# Patient Record
Sex: MALE | Race: WHITE | Employment: OTHER | URBAN - METROPOLITAN AREA
[De-identification: names, ages, dates, MRNs, and addresses within clinical notes are randomized per-mention and may not be internally consistent; named-entity substitution may affect disease eponyms.]

---

## 2022-02-17 ENCOUNTER — HOSPITAL ENCOUNTER (OUTPATIENT)
Age: 67
Setting detail: OBSERVATION
Discharge: HOME | End: 2022-02-18
Attending: EMERGENCY MEDICINE | Admitting: FAMILY MEDICINE
Payer: COMMERCIAL

## 2022-02-17 ENCOUNTER — APPOINTMENT (OUTPATIENT)
Dept: CT IMAGING | Age: 67
End: 2022-02-17
Payer: COMMERCIAL

## 2022-02-17 ENCOUNTER — APPOINTMENT (OUTPATIENT)
Dept: GENERAL RADIOLOGY | Age: 67
End: 2022-02-17
Payer: COMMERCIAL

## 2022-02-17 DIAGNOSIS — H53.8 BLURRED VISION, LEFT EYE: ICD-10-CM

## 2022-02-17 DIAGNOSIS — M54.2 NECK PAIN: ICD-10-CM

## 2022-02-17 DIAGNOSIS — R13.10 DYSPHAGIA, UNSPECIFIED TYPE: ICD-10-CM

## 2022-02-17 DIAGNOSIS — R07.9 CHEST PAIN, UNSPECIFIED TYPE: Primary | ICD-10-CM

## 2022-02-17 PROBLEM — R42 DIZZINESS: Status: ACTIVE | Noted: 2022-02-17

## 2022-02-17 LAB
A/G RATIO: 1.5 (ref 1.1–2.2)
ALBUMIN SERPL-MCNC: 4.4 G/DL (ref 3.4–5)
ALP BLD-CCNC: 89 U/L (ref 40–129)
ALT SERPL-CCNC: 31 U/L (ref 10–40)
ANION GAP SERPL CALCULATED.3IONS-SCNC: 14 MMOL/L (ref 3–16)
APTT: 34.1 SEC (ref 26.2–38.6)
AST SERPL-CCNC: 20 U/L (ref 15–37)
BASOPHILS ABSOLUTE: 0 K/UL (ref 0–0.2)
BASOPHILS RELATIVE PERCENT: 0.4 %
BILIRUB SERPL-MCNC: 0.3 MG/DL (ref 0–1)
BUN BLDV-MCNC: 17 MG/DL (ref 7–20)
CALCIUM SERPL-MCNC: 9.7 MG/DL (ref 8.3–10.6)
CHLORIDE BLD-SCNC: 100 MMOL/L (ref 99–110)
CO2: 25 MMOL/L (ref 21–32)
CREAT SERPL-MCNC: 1.3 MG/DL (ref 0.8–1.3)
EOSINOPHILS ABSOLUTE: 0.2 K/UL (ref 0–0.6)
EOSINOPHILS RELATIVE PERCENT: 2.3 %
GFR AFRICAN AMERICAN: >60
GFR NON-AFRICAN AMERICAN: 55
GLUCOSE BLD-MCNC: 104 MG/DL (ref 70–99)
HCT VFR BLD CALC: 44.6 % (ref 40.5–52.5)
HEMOGLOBIN: 14.8 G/DL (ref 13.5–17.5)
INR BLD: 0.96 (ref 0.88–1.12)
LYMPHOCYTES ABSOLUTE: 1.7 K/UL (ref 1–5.1)
LYMPHOCYTES RELATIVE PERCENT: 19.6 %
MCH RBC QN AUTO: 30.5 PG (ref 26–34)
MCHC RBC AUTO-ENTMCNC: 33 G/DL (ref 31–36)
MCV RBC AUTO: 92.3 FL (ref 80–100)
MONOCYTES ABSOLUTE: 0.8 K/UL (ref 0–1.3)
MONOCYTES RELATIVE PERCENT: 9.3 %
NEUTROPHILS ABSOLUTE: 5.9 K/UL (ref 1.7–7.7)
NEUTROPHILS RELATIVE PERCENT: 68.4 %
PDW BLD-RTO: 13.6 % (ref 12.4–15.4)
PLATELET # BLD: 271 K/UL (ref 135–450)
PMV BLD AUTO: 7.1 FL (ref 5–10.5)
POTASSIUM REFLEX MAGNESIUM: 4.3 MMOL/L (ref 3.5–5.1)
PROTHROMBIN TIME: 10.8 SEC (ref 9.9–12.7)
RBC # BLD: 4.84 M/UL (ref 4.2–5.9)
SODIUM BLD-SCNC: 139 MMOL/L (ref 136–145)
TOTAL PROTEIN: 7.4 G/DL (ref 6.4–8.2)
TROPONIN: <0.01 NG/ML
WBC # BLD: 8.7 K/UL (ref 4–11)

## 2022-02-17 PROCEDURE — 85610 PROTHROMBIN TIME: CPT

## 2022-02-17 PROCEDURE — 84484 ASSAY OF TROPONIN QUANT: CPT

## 2022-02-17 PROCEDURE — 93005 ELECTROCARDIOGRAM TRACING: CPT | Performed by: PHYSICIAN ASSISTANT

## 2022-02-17 PROCEDURE — G0378 HOSPITAL OBSERVATION PER HR: HCPCS

## 2022-02-17 PROCEDURE — 36415 COLL VENOUS BLD VENIPUNCTURE: CPT

## 2022-02-17 PROCEDURE — 85025 COMPLETE CBC W/AUTO DIFF WBC: CPT

## 2022-02-17 PROCEDURE — 71045 X-RAY EXAM CHEST 1 VIEW: CPT

## 2022-02-17 PROCEDURE — 99285 EMERGENCY DEPT VISIT HI MDM: CPT

## 2022-02-17 PROCEDURE — 70496 CT ANGIOGRAPHY HEAD: CPT

## 2022-02-17 PROCEDURE — 6360000004 HC RX CONTRAST MEDICATION: Performed by: EMERGENCY MEDICINE

## 2022-02-17 PROCEDURE — 85730 THROMBOPLASTIN TIME PARTIAL: CPT

## 2022-02-17 PROCEDURE — 6370000000 HC RX 637 (ALT 250 FOR IP): Performed by: EMERGENCY MEDICINE

## 2022-02-17 PROCEDURE — 70450 CT HEAD/BRAIN W/O DYE: CPT

## 2022-02-17 PROCEDURE — 6370000000 HC RX 637 (ALT 250 FOR IP): Performed by: PHYSICIAN ASSISTANT

## 2022-02-17 PROCEDURE — 80053 COMPREHEN METABOLIC PANEL: CPT

## 2022-02-17 PROCEDURE — 2060000000 HC ICU INTERMEDIATE R&B

## 2022-02-17 RX ORDER — ASPIRIN 81 MG/1
324 TABLET, CHEWABLE ORAL ONCE
Status: COMPLETED | OUTPATIENT
Start: 2022-02-17 | End: 2022-02-17

## 2022-02-17 RX ORDER — OXYCODONE HYDROCHLORIDE AND ACETAMINOPHEN 5; 325 MG/1; MG/1
1 TABLET ORAL ONCE
Status: COMPLETED | OUTPATIENT
Start: 2022-02-17 | End: 2022-02-17

## 2022-02-17 RX ORDER — OXYCODONE HYDROCHLORIDE AND ACETAMINOPHEN 5; 325 MG/1; MG/1
1 TABLET ORAL EVERY 4 HOURS PRN
COMMUNITY

## 2022-02-17 RX ADMIN — ASPIRIN 81 MG 324 MG: 81 TABLET ORAL at 17:45

## 2022-02-17 RX ADMIN — IOPAMIDOL 75 ML: 755 INJECTION, SOLUTION INTRAVENOUS at 16:22

## 2022-02-17 RX ADMIN — OXYCODONE HYDROCHLORIDE AND ACETAMINOPHEN 1 TABLET: 5; 325 TABLET ORAL at 18:15

## 2022-02-17 ASSESSMENT — ENCOUNTER SYMPTOMS
CONSTIPATION: 0
ABDOMINAL PAIN: 0
SHORTNESS OF BREATH: 1
COUGH: 0
DIARRHEA: 0
VOMITING: 0
TROUBLE SWALLOWING: 1
NAUSEA: 0
CHEST TIGHTNESS: 1
BACK PAIN: 0

## 2022-02-17 ASSESSMENT — PAIN DESCRIPTION - DESCRIPTORS
DESCRIPTORS: ACHING
DESCRIPTORS: ACHING;BURNING;SHOOTING;STABBING

## 2022-02-17 ASSESSMENT — PAIN SCALES - GENERAL
PAINLEVEL_OUTOF10: 2
PAINLEVEL_OUTOF10: 5

## 2022-02-17 ASSESSMENT — PAIN DESCRIPTION - PAIN TYPE
TYPE: ACUTE PAIN
TYPE: CHRONIC PAIN

## 2022-02-17 ASSESSMENT — PAIN DESCRIPTION - ORIENTATION
ORIENTATION: LEFT;RIGHT
ORIENTATION: LOWER

## 2022-02-17 ASSESSMENT — PAIN DESCRIPTION - LOCATION
LOCATION: HEAD;LEG
LOCATION: BACK

## 2022-02-17 NOTE — ED NOTES
Bed: 29  Expected date:   Expected time:   Means of arrival:   Comments:  Ben Devries, ERNESTINE  02/17/22 1523

## 2022-02-17 NOTE — ED PROVIDER NOTES
[facility]        Pt Name: Chanel Conner  MRN: [id number]  [place] 1955  Date of evaluation: 2/17/2022  Provider: BREE Ibrahim  PCP: Nidhi Sweet MD  ED Attending: Dr. Darylene Pool have seen and evaluated this patient with my supervising physician Jasmin Guardado, [address] [place] [address]       Chief Complaint   Patient presents with    Shortness of Breath     Pt arrives via [facility], c/o sob and dizziness starting about 1310 today. Pt states it feels like his \"throat is closing up\" and states he has blurriness in his vision beginning on his left side now experiencing it on his right side. Pt also c/o muscle shaking and anxiety.  Dizziness       HISTORY OF PRESENT ILLNESS   (Location, Timing/Onset, Context/Setting, Quality, Duration, Modifying Factors, Severity, Associated Signs and Symptoms)  Note limiting factors. Chanel Conner is a 73 y.o. male who presents to the emergency department with complaints of having an episode starting at 1 PM where he was having chest discomfort, shortness of breath and feels like his throat is closing. He states that he had blurriness in the left side and now is on his right side. He feels that he is very shaky and having anxiety. Denies sensation of room spinning. He feels that his throat is very dry, states that water was helping but they told him to stop drinking water. Patient on initial examination is very rude. Patient denies any difficulty swallowing as he is able to drink water. He feels a heaviness in the left side of his chest.  He has a history of hypertension. No history of cardiac disease. Denies numbness, tingling or focal weakness in upper or lower extremities. Asked him specifically if he felt like he was having any strokelike symptoms and he said no.     Nursing Notes were all reviewed and agreed with or any disagreements were addressed in the HPI.    REVIEW OF SYSTEMS    (2-9 systems for level 4, 10 or more for level 5)     Review of Systems   Constitutional: Negative for chills, fatigue and fever. HENT: Positive for trouble swallowing. Eyes: Positive for visual disturbance. Respiratory: Positive for chest tightness and shortness of breath. Negative for cough. Cardiovascular: Positive for chest pain. Negative for palpitations and leg swelling. Gastrointestinal: Negative for abdominal pain, constipation, diarrhea, nausea and vomiting. Musculoskeletal: Negative for back pain and myalgias. Neurological: Negative for dizziness, syncope, speech difficulty, weakness, light-headedness, numbness and headaches. All other systems reviewed and are negative. Positives and Pertinent negatives as per HPI. Except as noted above in the ROS, all other systems were reviewed and negative. PAST MEDICAL HISTORY     Past Medical History:   Diagnosis Date    Hypertension          SURGICAL HISTORY     Past Surgical History:   Procedure Laterality Date    BACK SURGERY           CURRENTMEDICATIONS       Previous Medications    LOSARTAN POTASSIUM (COZAAR PO)    Take by mouth    OXYCODONE-ACETAMINOPHEN (PERCOCET) 5-325 MG PER TABLET    Take 1 tablet by mouth every 4 hours as needed for Pain. VITAMIN D, CHOLECALCIFEROL, PO    Take by mouth         ALLERGIES     Unable to assess    FAMILYHISTORY     History reviewed. No pertinent family history.        SOCIAL HISTORY       Social History     Tobacco Use    Smoking status: Never Smoker    Smokeless tobacco: Never Used   Substance Use Topics    Alcohol use: Not Currently     Comment: occa    Drug use: Yes     Types: Marijuana (Weed)     Comment: occas       SCREENINGS    [place] Coma Scale  Eye Opening: Spontaneous  Best Verbal Response: Oriented  Best Motor Response: Obeys commands  Christal Coma Scale Score: 15        PHYSICAL EXAM    (up to 7 for level 4, 8 or more for level 5)     ED Triage Vitals [02/17/22 1431]   BP Temp Temp Source Pulse Resp SpO2 Height Weight   133/68 98.6 °F (37 °C) Oral 71 25 100 % 5' 9\" (1.753 m) 283 lb (128.4 kg)       Physical Exam  Vitals and nursing note reviewed. Constitutional:       Appearance: Normal appearance. He is well-developed. He is not toxic-appearing or diaphoretic. HENT:      Head: Normocephalic. Right Ear: External ear normal.      Left Ear: External ear normal.      Nose: Nose normal.   Eyes:      General:         Right eye: No discharge. Left eye: No discharge. Conjunctiva/sclera: Conjunctivae normal.      Pupils: Pupils are equal, round, and reactive to light. Neck:      Trachea: Phonation normal.      Meningeal: Brudzinski's sign and Kernig's sign absent. Cardiovascular:      Rate and Rhythm: Normal rate and regular rhythm. Heart sounds: Normal heart sounds. No murmur heard. No friction rub. No gallop. Pulmonary:      Effort: Pulmonary effort is normal. No respiratory distress. Breath sounds: Normal breath sounds. No stridor. No wheezing or rales. Musculoskeletal:         General: Normal range of motion. Cervical back: Full passive range of motion without pain, normal range of motion and neck supple. No rigidity. No spinous process tenderness or muscular tenderness. Normal range of motion. Skin:     General: Skin is warm and dry. Coloration: Skin is not pale. Neurological:      Mental Status: He is alert and oriented to person, place, and time. He is not disoriented. GCS: GCS eye subscore is 4. GCS verbal subscore is 5. GCS motor subscore is 6. Cranial Nerves: No cranial nerve deficit. Sensory: No sensory deficit. Coordination: Coordination normal.      Comments: Negative pronator drift, normal finger to nose, cranial nerves II through XII intact. Psychiatric:         Behavior: Behavior normal. Behavior is cooperative.          DIAGNOSTIC RESULTS   LABS:  Results for orders placed or performed during the hospital encounter of 02/17/22   CBC with Auto Differential   Result Value Ref Range    WBC 8.7 4.0 - 11.0 K/uL    RBC 4.84 4.20 - 5.90 M/uL    Hemoglobin 14.8 13.5 - 17.5 g/dL    Hematocrit 44.6 40.5 - 52.5 %    MCV 92.3 80.0 - 100.0 fL    MCH 30.5 26.0 - 34.0 pg    MCHC 33.0 31.0 - 36.0 g/dL    RDW 13.6 12.4 - 15.4 %    Platelets 690 730 - 182 K/uL    MPV 7.1 5.0 - 10.5 fL    Neutrophils % 68.4 %    Lymphocytes % 19.6 %    Monocytes % 9.3 %    Eosinophils % 2.3 %    Basophils % 0.4 %    Neutrophils Absolute 5.9 1.7 - 7.7 K/uL    Lymphocytes Absolute 1.7 1.0 - 5.1 K/uL    Monocytes Absolute 0.8 0.0 - 1.3 K/uL    Eosinophils Absolute 0.2 0.0 - 0.6 K/uL    Basophils Absolute 0.0 0.0 - 0.2 K/uL   Comprehensive Metabolic Panel w/ Reflex to MG   Result Value Ref Range    Sodium 139 136 - 145 mmol/L    Potassium reflex Magnesium 4.3 3.5 - 5.1 mmol/L    Chloride 100 99 - 110 mmol/L    CO2 25 21 - 32 mmol/L    Anion Gap 14 3 - 16    Glucose 104 (H) 70 - 99 mg/dL    BUN 17 7 - 20 mg/dL    CREATININE 1.3 0.8 - 1.3 mg/dL    GFR Non-African American 55 (A) >60    GFR African American >60 >60    Calcium 9.7 8.3 - 10.6 mg/dL    Total Protein 7.4 6.4 - 8.2 g/dL    Albumin 4.4 3.4 - 5.0 g/dL    Albumin/Globulin Ratio 1.5 1.1 - 2.2    Total Bilirubin 0.3 0.0 - 1.0 mg/dL    Alkaline Phosphatase 89 40 - 129 U/L    ALT 31 10 - 40 U/L    AST 20 15 - 37 U/L   Troponin   Result Value Ref Range    Troponin <0.01 <0.01 ng/mL   Protime-INR   Result Value Ref Range    Protime 10.8 9.9 - 12.7 sec    INR 0.96 0.88 - 1.12   APTT   Result Value Ref Range    aPTT 34.1 26.2 - 38.6 sec   EKG 12 Lead   Result Value Ref Range    Ventricular Rate 68 BPM    Atrial Rate 68 BPM    P-R Interval 130 ms    QRS Duration 84 ms    Q-T Interval 410 ms    QTc Calculation (Bazett) 435 ms    P Axis 18 degrees    R Axis 37 degrees    T Axis 29 degrees    Diagnosis Normal sinus rhythmNormal ECG        All other labs were within normal range or not returned as of this dictation. EKG: All EKG's are interpreted by the Emergency Department Physician in the absence of a cardiologist.  Please see their note for interpretation of EKG. RADIOLOGY:   Non-plain film images such as CT, Ultrasound and MRI are read by the radiologist. Plain radiographic images are visualized and preliminarily interpreted by the ED Provider with the below findings:    Interpretation per the Radiologist below, if available at the time of this note:    CTA HEAD NECK W CONTRAST   Final Result   Unremarkable CTA of the head and neck. RECOMMENDATIONS:   Unavailable         XR CHEST PORTABLE   Final Result   1. Hazy opacity projectingi at the right lung base could represent   atelectasis versus pneumonia in the proper clinical setting. Follow-up to   resolution is recommended. 2. Borderline cardiomegaly. CT HEAD WO CONTRAST   Final Result   No acute intracranial abnormality. Mild mucosal thickening in the ethmoids      RECOMMENDATIONS:   Unavailable           PROCEDURES   Unless otherwise noted below, none     Procedures    CRITICAL CARE TIME   N/A    CONSULTS:  None      EMERGENCY DEPARTMENT COURSE and DIFFERENTIAL DIAGNOSIS/MDM:   Vitals:    Vitals:    02/17/22 1600 02/17/22 1615 02/17/22 1630 02/17/22 1645   BP: 128/76  122/66 114/69   Pulse: 64 63 58 62   Resp: 17 17 13 12   Temp:       TempSrc:       SpO2: 98% 98% 97% 97%   Weight:       Height:           Patient was given the following medications:  Medications   aspirin chewable tablet 324 mg (has no administration in time range)   iopamidol (ISOVUE-370) 76 % injection 75 mL (75 mLs IntraVENous Given 2/17/22 1622)       Presents to the emergency department with a few different complaints. Patient is complaining of chest pain, shortness of breath and feeling like his throat is closing.  States that he has a dry mouth but also states that the left side of his face feels like it is clogged and draining into his throat. Patient does complain of visual changes in bilateral eyes. There is no evidence of focal weakness on exam. CT head without contrast shows no acute intracranial normalities, CTA of head and neck no acute findings. Chest x-ray shows no acute cardiopulmonary disease, EKG normal and troponin normal.    We will discuss with hospitalist for admission for evaluation for chest pain and MRI for stroke rule out. The patient tolerated their visit well. They were seen and evaluated by the attending physician, who agreed with the assessment and plan. I have discussed the findings of today's workup with the patient and addressed the patient's questions and concerns. FINAL IMPRESSION      1. Chest pain, unspecified type    2. Blurred vision, bilateral          DISPOSITION/PLAN   DISPOSITION        PATIENT REFERREDTO:  No follow-up provider specified.     DISCHARGE MEDICATIONS:  New Prescriptions    No medications on file       DISCONTINUED MEDICATIONS:  Discontinued Medications    No medications on file              (Please note that portions of this note were completed with a voice recognition program.  Efforts were made to edit the dictations but occasionally words are mis-transcribed.)    BREE Callejas (electronically signed)       BREE Singleton  02/17/22 8424

## 2022-02-17 NOTE — ED NOTES
Pt able to ambulate to bathroom with no assistance from staff.      Neville Al, ERNESTINE  02/17/22 8117

## 2022-02-17 NOTE — ED NOTES
Pt able to clear thin liquids from his oropharynx, but does state that \"at times it has felt like it's stuck. \"    Wife to go home to bring Pt's CPAP.      Vesta Long RN  02/17/22 6397

## 2022-02-18 ENCOUNTER — APPOINTMENT (OUTPATIENT)
Dept: MRI IMAGING | Age: 67
End: 2022-02-18
Payer: COMMERCIAL

## 2022-02-18 ENCOUNTER — APPOINTMENT (OUTPATIENT)
Dept: GENERAL RADIOLOGY | Age: 67
End: 2022-02-18
Payer: COMMERCIAL

## 2022-02-18 VITALS
RESPIRATION RATE: 18 BRPM | SYSTOLIC BLOOD PRESSURE: 130 MMHG | DIASTOLIC BLOOD PRESSURE: 83 MMHG | HEART RATE: 60 BPM | BODY MASS INDEX: 41.92 KG/M2 | WEIGHT: 283 LBS | OXYGEN SATURATION: 92 % | HEIGHT: 69 IN | TEMPERATURE: 96.7 F

## 2022-02-18 PROBLEM — H53.8 BLURRY VISION: Status: RESOLVED | Noted: 2022-02-17 | Resolved: 2022-02-18

## 2022-02-18 LAB
ANION GAP SERPL CALCULATED.3IONS-SCNC: 9 MMOL/L (ref 3–16)
BUN BLDV-MCNC: 14 MG/DL (ref 7–20)
CALCIUM SERPL-MCNC: 9.2 MG/DL (ref 8.3–10.6)
CHLORIDE BLD-SCNC: 104 MMOL/L (ref 99–110)
CHOLESTEROL, TOTAL: 200 MG/DL (ref 0–199)
CO2: 26 MMOL/L (ref 21–32)
CREAT SERPL-MCNC: 1.1 MG/DL (ref 0.8–1.3)
EKG ATRIAL RATE: 68 BPM
EKG DIAGNOSIS: NORMAL
EKG P AXIS: 18 DEGREES
EKG P-R INTERVAL: 130 MS
EKG Q-T INTERVAL: 410 MS
EKG QRS DURATION: 84 MS
EKG QTC CALCULATION (BAZETT): 435 MS
EKG R AXIS: 37 DEGREES
EKG T AXIS: 29 DEGREES
EKG VENTRICULAR RATE: 68 BPM
ESTIMATED AVERAGE GLUCOSE: 128.4 MG/DL
GFR AFRICAN AMERICAN: >60
GFR NON-AFRICAN AMERICAN: >60
GLUCOSE BLD-MCNC: 127 MG/DL (ref 70–99)
HBA1C MFR BLD: 6.1 %
HCT VFR BLD CALC: 44 % (ref 40.5–52.5)
HDLC SERPL-MCNC: 35 MG/DL (ref 40–60)
HEMOGLOBIN: 14.5 G/DL (ref 13.5–17.5)
LDL CHOLESTEROL CALCULATED: 137 MG/DL
LV EF: 58 %
LVEF MODALITY: NORMAL
MCH RBC QN AUTO: 30.4 PG (ref 26–34)
MCHC RBC AUTO-ENTMCNC: 32.8 G/DL (ref 31–36)
MCV RBC AUTO: 92.7 FL (ref 80–100)
PDW BLD-RTO: 13.7 % (ref 12.4–15.4)
PLATELET # BLD: 218 K/UL (ref 135–450)
PMV BLD AUTO: 7.2 FL (ref 5–10.5)
POTASSIUM SERPL-SCNC: 4.9 MMOL/L (ref 3.5–5.1)
RBC # BLD: 4.75 M/UL (ref 4.2–5.9)
SODIUM BLD-SCNC: 139 MMOL/L (ref 136–145)
TRIGL SERPL-MCNC: 142 MG/DL (ref 0–150)
VLDLC SERPL CALC-MCNC: 28 MG/DL
WBC # BLD: 7.1 K/UL (ref 4–11)

## 2022-02-18 PROCEDURE — 74230 X-RAY XM SWLNG FUNCJ C+: CPT

## 2022-02-18 PROCEDURE — 80048 BASIC METABOLIC PNL TOTAL CA: CPT

## 2022-02-18 PROCEDURE — G0378 HOSPITAL OBSERVATION PER HR: HCPCS

## 2022-02-18 PROCEDURE — 93306 TTE W/DOPPLER COMPLETE: CPT

## 2022-02-18 PROCEDURE — 70551 MRI BRAIN STEM W/O DYE: CPT

## 2022-02-18 PROCEDURE — 96372 THER/PROPH/DIAG INJ SC/IM: CPT

## 2022-02-18 PROCEDURE — 92611 MOTION FLUOROSCOPY/SWALLOW: CPT

## 2022-02-18 PROCEDURE — 6360000002 HC RX W HCPCS: Performed by: FAMILY MEDICINE

## 2022-02-18 PROCEDURE — 99221 1ST HOSP IP/OBS SF/LOW 40: CPT | Performed by: STUDENT IN AN ORGANIZED HEALTH CARE EDUCATION/TRAINING PROGRAM

## 2022-02-18 PROCEDURE — 85027 COMPLETE CBC AUTOMATED: CPT

## 2022-02-18 PROCEDURE — 97530 THERAPEUTIC ACTIVITIES: CPT

## 2022-02-18 PROCEDURE — 83036 HEMOGLOBIN GLYCOSYLATED A1C: CPT

## 2022-02-18 PROCEDURE — 80061 LIPID PANEL: CPT

## 2022-02-18 PROCEDURE — 6370000000 HC RX 637 (ALT 250 FOR IP): Performed by: FAMILY MEDICINE

## 2022-02-18 PROCEDURE — 97162 PT EVAL MOD COMPLEX 30 MIN: CPT

## 2022-02-18 PROCEDURE — 97165 OT EVAL LOW COMPLEX 30 MIN: CPT

## 2022-02-18 PROCEDURE — 99222 1ST HOSP IP/OBS MODERATE 55: CPT | Performed by: PSYCHIATRY & NEUROLOGY

## 2022-02-18 PROCEDURE — 36415 COLL VENOUS BLD VENIPUNCTURE: CPT

## 2022-02-18 PROCEDURE — 92526 ORAL FUNCTION THERAPY: CPT

## 2022-02-18 PROCEDURE — 94761 N-INVAS EAR/PLS OXIMETRY MLT: CPT

## 2022-02-18 PROCEDURE — 92610 EVALUATE SWALLOWING FUNCTION: CPT

## 2022-02-18 PROCEDURE — 93010 ELECTROCARDIOGRAM REPORT: CPT | Performed by: INTERNAL MEDICINE

## 2022-02-18 RX ORDER — FLUTICASONE PROPIONATE 50 MCG
1 SPRAY, SUSPENSION (ML) NASAL DAILY
Qty: 16 G | Refills: 3 | OUTPATIENT
Start: 2022-02-18

## 2022-02-18 RX ORDER — FLUTICASONE PROPIONATE 50 MCG
1 SPRAY, SUSPENSION (ML) NASAL DAILY
Status: DISCONTINUED | OUTPATIENT
Start: 2022-02-18 | End: 2022-02-18 | Stop reason: HOSPADM

## 2022-02-18 RX ORDER — PANTOPRAZOLE SODIUM 40 MG/1
40 TABLET, DELAYED RELEASE ORAL
Status: DISCONTINUED | OUTPATIENT
Start: 2022-02-19 | End: 2022-02-18 | Stop reason: HOSPADM

## 2022-02-18 RX ORDER — ONDANSETRON 4 MG/1
4 TABLET, ORALLY DISINTEGRATING ORAL EVERY 8 HOURS PRN
Status: DISCONTINUED | OUTPATIENT
Start: 2022-02-18 | End: 2022-02-18 | Stop reason: HOSPADM

## 2022-02-18 RX ORDER — ROSUVASTATIN CALCIUM 40 MG/1
40 TABLET, COATED ORAL NIGHTLY
Status: DISCONTINUED | OUTPATIENT
Start: 2022-02-18 | End: 2022-02-18 | Stop reason: HOSPADM

## 2022-02-18 RX ORDER — ASPIRIN 81 MG/1
81 TABLET ORAL DAILY
Qty: 30 TABLET | Refills: 3 | Status: SHIPPED | OUTPATIENT
Start: 2022-02-19

## 2022-02-18 RX ORDER — ASPIRIN 300 MG/1
300 SUPPOSITORY RECTAL DAILY
Status: DISCONTINUED | OUTPATIENT
Start: 2022-02-18 | End: 2022-02-18 | Stop reason: HOSPADM

## 2022-02-18 RX ORDER — PANTOPRAZOLE SODIUM 40 MG/1
40 TABLET, DELAYED RELEASE ORAL
Qty: 30 TABLET | Refills: 1 | OUTPATIENT
Start: 2022-02-19

## 2022-02-18 RX ORDER — POLYETHYLENE GLYCOL 3350 17 G/17G
17 POWDER, FOR SOLUTION ORAL DAILY PRN
Status: DISCONTINUED | OUTPATIENT
Start: 2022-02-18 | End: 2022-02-18 | Stop reason: HOSPADM

## 2022-02-18 RX ORDER — ASPIRIN 81 MG/1
81 TABLET ORAL DAILY
Status: DISCONTINUED | OUTPATIENT
Start: 2022-02-18 | End: 2022-02-18 | Stop reason: HOSPADM

## 2022-02-18 RX ORDER — OXYCODONE HYDROCHLORIDE AND ACETAMINOPHEN 5; 325 MG/1; MG/1
1 TABLET ORAL EVERY 4 HOURS PRN
Status: DISCONTINUED | OUTPATIENT
Start: 2022-02-18 | End: 2022-02-18 | Stop reason: HOSPADM

## 2022-02-18 RX ORDER — ONDANSETRON 2 MG/ML
4 INJECTION INTRAMUSCULAR; INTRAVENOUS EVERY 6 HOURS PRN
Status: DISCONTINUED | OUTPATIENT
Start: 2022-02-18 | End: 2022-02-18 | Stop reason: HOSPADM

## 2022-02-18 RX ORDER — PANTOPRAZOLE SODIUM 40 MG/1
40 TABLET, DELAYED RELEASE ORAL
Qty: 30 TABLET | Refills: 3 | Status: SHIPPED | OUTPATIENT
Start: 2022-02-19

## 2022-02-18 RX ADMIN — ENOXAPARIN SODIUM 40 MG: 40 INJECTION SUBCUTANEOUS at 10:20

## 2022-02-18 RX ADMIN — OXYCODONE AND ACETAMINOPHEN 1 TABLET: 5; 325 TABLET ORAL at 13:17

## 2022-02-18 RX ADMIN — ASPIRIN 81 MG: 81 TABLET, COATED ORAL at 10:20

## 2022-02-18 RX ADMIN — OXYCODONE AND ACETAMINOPHEN 1 TABLET: 5; 325 TABLET ORAL at 17:18

## 2022-02-18 ASSESSMENT — PAIN DESCRIPTION - LOCATION
LOCATION: BACK
LOCATION: HEAD
LOCATION: HEAD

## 2022-02-18 ASSESSMENT — PAIN SCALES - GENERAL
PAINLEVEL_OUTOF10: 6
PAINLEVEL_OUTOF10: 3
PAINLEVEL_OUTOF10: 4
PAINLEVEL_OUTOF10: 3
PAINLEVEL_OUTOF10: 4
PAINLEVEL_OUTOF10: 1

## 2022-02-18 ASSESSMENT — PAIN DESCRIPTION - ORIENTATION: ORIENTATION: MID

## 2022-02-18 ASSESSMENT — PAIN DESCRIPTION - PAIN TYPE: TYPE: ACUTE PAIN

## 2022-02-18 NOTE — ED PROVIDER NOTES
In addition to the advanced practice provider, I personally saw Connor Krishnamurthy and performed a substantive portion of the visit including all aspects of the medical decision making. Briefly, this is a 73 y.o. male here for neck and facial pain. Patient states the symptoms began around 1300 today. Associated with tingling sensation around his left forehead and under his left eye, and pain along his left neck. . Patient also reports to me feeling his vision is blurry in his left eye as well. After the symptoms began, patient reported he began to have some tight chest pain in his left chest, nothing seemed to make this any better or worse. Overall feels his symptoms have significantly improved since onset. Denies any history of similar. He denies any fevers, chills, double vision or motor weakness. On exam, patient afebrile and nontoxic. No distress. Heart RRR. Lungs CTAB. Abdomen soft, nondistended, nontender to palpation in all quadrants. A&Ox4. Speech clear, face symmetric. PERRL, corneas clear. Visual fields intact by confrontation. CN 2-12 intact. 5/5 motor and sensation grossly intact all extremities. No pronator drift. Normal finger to nose, normal heel to shin. Normal gait observed. EKG  EKG was reviewed by emergency department physician in the absence of a cardiologist    Narrow complex sinus rhythm, rate 68, normal axis, normal PA and QRS intervals, normal Qtc, no ST elevations or depressions, normal t-wave morphology, impression NSR, no STEMI, no comparison available      Screenings  NIH Stroke Scale  Interval: Reassessment  Level of Consciousness (1a. ): Alert  LOC Questions (1b. ):  Answers both correctly  LOC Commands (1c. ): Performs both tasks correctly  Best Gaze (2. ): Normal  Visual (3. ): No visual loss  Facial Palsy (4. ): Normal symmetrical movement  Motor Arm, Left (5a. ): No drift  Motor Arm, Right (5b. ): No drift  Motor Leg, Left (6a. ): No drift  Motor Leg, Right (6b. ): No drift  Limb Ataxia (7. ): Absent  Sensory (8. ): Normal  Best Language (9. ): No aphasia  Dysarthria (10. ): Normal  Extinction and Inattention (11): No abnormality  Total: 0Glasgow Coma Scale  Eye Opening: Spontaneous  Best Verbal Response: Oriented  Best Motor Response: Obeys commands  [place] Coma Scale Score: 15        MDM    Patient afebrile and nontoxic. No distress. No hypoxia or increased work of breathing. EKG without evidence of acute ischemia, troponin normal, ACS is not immediately evident and my clinical suspicion is fairly low. No malignant dysrhythmia. Neuro exam nonfocal, initial NIHSS of 0. Patient has received CT scan which showed no hemorrhage or mass-effect. Given patient is reporting sudden onset blurry vision with neck pain, I did proceed with stroke alert activation at time of my initial evaluation. Will additionally order CTA head and neck which showed no evidence of vascular dissection, critical stenosis or large vessel occlusion. Case was discussed with Karen Benitez for  stroke team, given the patient's deficits are overall mild and spontaneously improving, patient is not felt to be an appropriate candidate for consideration of thrombolytics. I discussed risks of thrombolytics with patient and his spouse and they are agreeable to defer this medication. Laboratory work-up is overall reassuring with no evidence of acute endorgan dysfunction or clinically significant electrolyte derangement. TIA/CVA as cause of patient's current symptoms cannot be fully excluded, aspirin was given, case discussed with internal medicine team who will admit for further evaluation and care. Critical Care:    I have discussed the case with the advanced practice provider. I have personally performed a history, physical exam, and my own medical decision making. I have reviewed the note and agree with the findings and plan.     Upon my evaluation, this patient had a high probability of imminent or life-threatening deterioration due to acute vision loss, potential CVA/TIA which required my direct attention, intervention, and personal management. Specialist consultation with UC stroke team was obtained. I personally saw the patient and independently provided 33 minutes of non-concurrent critical care out of the total shared critical care time provided. The critical care time spent while evaluating and treating this patient was exclusive of any time spent doing separately billable procedures. This critical care time includes time at the bedside, data interpretation, medication management, monitoring for potential decompensation and physician consultation. Specifics of interventions taken and potentially life-threatening diagnostic considerations are listed above in the medical decision making. Patient Referrals:  No follow-up provider specified. Discharge Medications:  Current Discharge Medication List          FINAL IMPRESSION  1. Chest pain, unspecified type    2. Blurred vision, left eye    3. Neck pain        Blood pressure 110/65, pulse 62, temperature 97.6 °F (36.4 °C), temperature source Temporal, resp. rate 16, height 5' 9\" (1.753 m), weight 283 lb (128.4 kg), SpO2 97 %. For further details of [address] emergency department encounter, please see documentation by advanced practice provider, BREE Callejas.     Jaqui Faye DO (electronically signed)  Attending Emergency Physician       Jaqui Faye DO  02/18/22 0122

## 2022-02-18 NOTE — CONSULTS
In patient Neurology consult        [facility] Neurology      MD Madeline Warrenadrian [place]  1955    Date of Service: 2/18/2022    Referring Physician: Brandon Aparicio MD      Reason for the consult and CC: New onset dizziness and dysphagia    HPI:   The patient is a 73y.o.  years old male with history of hypertension and smoking who came to the hospital yesterday with above concern. Symptoms started yesterday morning. Description feeling lightheaded and dizzy but no spinning sensation. Degree was moderate. Duration was persistent. No chest pain or headache but he did have some dysphagia and difficulties with sinus pressure. No other relieving or aggravating factors. No clear triggers. Came to the ED where he was admitted. Initial work-up with CT head showed no acute findings. Today he denies any new symptoms. No dizziness but feel sore his throat and difficulties with swallowing. Other review of system was unremarkable. History reviewed. No pertinent family history.   Past Surgical History:   Procedure Laterality Date    BACK SURGERY          Past Medical History:   Diagnosis Date    Hypertension      Social History     Tobacco Use    Smoking status: Never Smoker    Smokeless tobacco: Never Used   Substance Use Topics    Alcohol use: Not Currently     Comment: occa    Drug use: Yes     Types: Marijuana Jennings Dona)     Comment: occas     Allergies   Allergen Reactions    Unable To Assess      \" some Rx for chemical stress test caused SOB\"     Current Facility-Administered Medications   Medication Dose Route Frequency Provider Last Rate Last Admin    ondansetron (ZOFRAN-ODT) disintegrating tablet 4 mg  4 mg Oral Q8H PRN Pauline Blanco MD        Or    ondansetron (ZOFRAN) injection 4 mg  4 mg IntraVENous Q6H PRN Pauline Blanco MD        polyethylene glycol (GLYCOLAX) packet 17 g  17 g Oral Daily PRN Pauline Blanco MD        enoxaparin (LOVENOX) injection 40 mg  40 mg SubCUTAneous Daily Pamela Vick MD   40 mg at 02/18/22 1020    aspirin EC tablet 81 mg  81 mg Oral Daily Pamela Vick MD   81 mg at 02/18/22 1020    Or    aspirin suppository 300 mg  300 mg Rectal Daily Pamela Vick MD        rosuvastatin (CRESTOR) tablet 40 mg  40 mg Oral Nightly Pamela Vick MD        oxyCODONE-acetaminophen (PERCOCET) 5-325 MG per tablet 1 tablet  1 tablet Oral Q4H PRN Pamela Vick MD           ROS : A 10-14 system review of constitutional, cardiovascular, respiratory, eyes, musculoskeletal, endocrine, GI, ENT, skin, hematological, genitourinary, psychiatric and neurologic systems was obtained and updated today and is unremarkable except as mentioned in my HPI      Exam:     Constitutional:   Vitals:    02/18/22 0147 02/18/22 0400 02/18/22 0451 02/18/22 0736   BP:   112/68 110/73   Pulse: 62 55 55 62   Resp:   16 16   Temp:   97.6 °F (36.4 °C) 96.2 °F (35.7 °C)   TempSrc:   Temporal Temporal   SpO2:   94% 93%   Weight:       Height:           General appearance:  Normal development and appear in no acute distress. Eye:  Fundus of the eye: Funduscopic examination cannot be performed due to COVID19 restrictions and precautions. Neck: supple  Cardiovascular: No lower leg edema with good pulsation. Mental Status:   Oriented to person, place, problem, and time. Memory: Good immediate recall. Intact remote memory  Normal attention span and concentration. Language: intact naming, repeating and fluency   Good fund of Knowledge. Aware of current events and vocabulary   Cranial Nerves:   II: Visual fields: Full. Pupils: equal, round, reactive to light  III,IV,VI: Extra Ocular Movements are intact. No nystagmus  V: Facial sensation is intact   VII: Facial strength and movements: intact and symmetric  VIII: Hearing: Intact  IX: Palate elevation is symmetric  XI: Shoulder shrug is intact  XII: Tongue movements are normal  Musculoskeletal: 5/5 in all 4 extremities. Tone: Normal tone.    Reflexes: 2+ in the upper extremity and 2+ in the lower extremity   Planters: flexor bilaterally. Coordination: no pronator drift, no dysmetria with FNF in upper extremities. Normal REM. Sensation: normal to all modalities in both arms and legs. Gait/Posture: steady gait    Data:  LABS:   Lab Results   Component Value Date     02/18/2022    K 4.9 02/18/2022    K 4.3 02/17/2022     02/18/2022    CO2 26 02/18/2022    BUN 14 02/18/2022    CREATININE 1.1 02/18/2022    GFRAA >60 02/18/2022    LABGLOM >60 02/18/2022    GLUCOSE 127 02/18/2022    CALCIUM 9.2 02/18/2022     Lab Results   Component Value Date    WBC 7.1 02/18/2022    RBC 4.75 02/18/2022    HGB 14.5 02/18/2022    HCT 44.0 02/18/2022    MCV 92.7 02/18/2022    RDW 13.7 02/18/2022     02/18/2022     Lab Results   Component Value Date    INR 0.96 02/17/2022    PROTIME 10.8 02/17/2022       Neuroimaging were independently reviewed by me and discussed results with the patient  Reviewed notes from different physicians  Reviewed lab and blood testing    Impression:  New onset dizziness with dysphagia. So far no specific etiology. Exam is nonfocal.  Would recommend MRI brain if possible to exclude new ischemic stroke although seems less likely. Will need GI work-up for dysphagia  Hypertension    Recommendation:  MRI brain if possible  GI work-up for dysphagia and awaiting MBS results  Aspirin  Statin  Speech  BP monitor for now  PT OT  Telemetry  DVT and GI prophylaxis  Can be discharged from neurology once medically stable  Discussed with primary team  No further recommendation      Thank you for referring such patient. If you have any questions regarding my consult note, please don't hesitate to call me. Guillermo Muir MD  [phone]    This dictation was generated by voice recognition computer software.  Although all attempts are made to edit the dictation for accuracy, there may be errors in the  transcription that are not intended

## 2022-02-18 NOTE — CONSULTS
Abhay      Patient Name: [facility] Record Number:  [id number]  Primary Care Physician:  May Gold MD  Date of Consultation: 2/18/2022    Chief Complaint: Difficulty swallowing, left nasal congestion, dizziness      HISTORY OF PRESENT Sindi Heredia is a(n) 73 y.o. male who presents with acute onset of left sided nasal congestion, feeling like something stuck in throat, difficulty swallowing, and dizziness all starting yesterday. Caused him panic and shortness of breath. Now swallowing if improved but still feels fullness in throat. + hx of anxiety. Dizziness described as lightheadedness and was associated with a headache. No room spinning. No hearing changes. No tinnitus. Able to tolerate secretions. Was told in the past he was a difficult intubation by anesthesiologist for surgery. Still with left nasal congestion, feels like something is in his nose. Used flonase years ago. No known environmental allergies. Former remote smoking hx. Today without SOB. Never had stridor or noisy breathing. Patient Active Problem List   Diagnosis    Dizziness    Blurry vision    Cerebrovascular accident (CVA) ([address]    Oral phase dysphagia    HTN (hypertension), benign     Past Surgical History:   Procedure Laterality Date    BACK SURGERY       History reviewed. No pertinent family history.   Social History     Socioeconomic History    Marital status:      Spouse name: Not on file    Number of children: Not on file    Years of education: Not on file    Highest education level: Not on file   Occupational History    Not on file   Tobacco Use    Smoking status: Never Smoker    Smokeless tobacco: Never Used   Substance and Sexual Activity    Alcohol use: Not Currently     Comment: occa    Drug use: Yes     Types: Marijuana [place])     Comment: occas    Sexual activity: Not on file   Other Topics Concern    Not on file Social History Narrative    Not on file     Social Determinants of Health     Financial Resource Strain:     Difficulty of Paying Living Expenses: Not on file   Food Insecurity:     Worried About Running Out of Food in the Last Year: Not on file    Cherry of Food in the Last Year: Not on file   Transportation Needs:     Lack of Transportation (Medical): Not on file    Lack of Transportation (Non-Medical): Not on file   Physical Activity:     Days of Exercise per Week: Not on file    Minutes of Exercise per Session: Not on file   Stress:     Feeling of Stress : Not on file   Social Connections:     Frequency of Communication with Friends and Family: Not on file    Frequency of Social Gatherings with Friends and Family: Not on file    Attends Rastafari Services: Not on file    Active Member of [address] [place] [address] [employer] or Organizations: Not on file    Attends Club or Organization Meetings: Not on file    Marital Status: Not on file   Intimate Partner Violence:     Fear of Current or Ex-Partner: Not on file    Emotionally Abused: Not on file    Physically Abused: Not on file    Sexually Abused: Not on file   Housing Stability:     Unable to Pay for Housing in the Last Year: Not on file    Number of [place] in the Last Year: Not on file    Unstable Housing in the Last Year: Not on file       DRUG/FOOD ALLERGIES: Unable to assess    CURRENT MEDICATIONS  Prior to Admission medications    Medication Sig Start Date End Date Taking? Authorizing Provider   Losartan Potassium (COZAAR PO) Take by mouth   Yes Historical Provider, MD   oxyCODONE-acetaminophen (PERCOCET) 5-325 MG per tablet Take 1 tablet by mouth every 4 hours as needed for Pain.    Yes Historical Provider, MD   VITAMIN D, CHOLECALCIFEROL, PO Take by mouth   Yes Historical Provider, MD       REVIEW OF SYSTEMS  The following systems were reviewed and revealed the following in addition to any already discussed in the HPI:    CONSTITUTIONAL: no weight loss, no fever, no night sweats, no chills  EYES: no vision changes, +blurry vision  EARS: no changes in hearing, no otalgia  NOSE: no epistaxis, no rhinorrhea  RESPIRATORY: no difficulty breathing, +shortness of breath upon admission  CV: +chest pain upon admission, no lower extremity swelling  HEME: no coagulation disorder, no known bleeding disorders  NEURO: no TIA or stroke-like symptoms  SKIN: no new rashes in the head and neck, no recently diagnosed skin cancers  MOUTH: no new oral ulcers, no recent tooth infections  GASTROINTESTINAL: no diarrhea, no stomach pain  PSYCH: +anxiety, no depression    PHYSICAL EXAM  BP (!) 167/84 Comment: Taken after ambulating and going up/down 14 steps  Pulse 62   Temp 96.2 °F (35.7 °C) (Temporal)   Resp 16   Ht 5' 9\" (1.753 m)   Wt 283 lb (128.4 kg)   SpO2 95%   BMI 41.79 kg/m²     GENERAL: No Acute Distress, Alert and Oriented, no hoarseness  EYES: EOMI, Anti-icteric  NOSE: No epistaxis, nasal mucosa within normal limits, no purulent drainage. On anterior rhinoscopy the nasal septum is mildly deviated to the left. Small area or irritation along left caudal septum, no bleeding or dried blood. Nasal passages appear patent bilaterally. EARS: Normal external canal appearance, EAC patent bilaterally, TMs intact, no   FACE: HB 1/6 bilaterally, symmetric appearing, sensation equal bilaterally  ORAL CAVITY: Mallampati 4. Unable to visualize oropharynx. No oral masses or lesions, no evidence of inflammation. Uvula midline, moist mucous membranes. NECK: Normal range of motion, no thyromegaly, trachea is midline, no palpable lymphadenopathy or neck masses, no crepitus  CHEST: Normal respiratory effort, breathing comfortably, no retractions. On RA. No stridor or noisy breathing.    SKIN: No rashes, normal appearing skin, no evidence of skin lesions/tumors  NEURO: Cranial Nerves 2, 3, 4, 5, 6, 7, 11, 12 intact bilaterally     I have performed a head and neck physical exam personally or was physically present during the key or critical portions of the service. LABS  Component Ref Range & Units 2/18/22 0549 2/17/22 1506 11/14/17 1041   WBC 4.0 - 11.0 K/uL 7.1  8.7  11.9 High     RBC 4.20 - 5.90 M/uL 4.75  4.84  5.17    Hemoglobin 13.5 - 17.5 g/dL 14.5  14.8  15.3    Hematocrit 40.5 - 52.5 % 44.0  44.6  46.0                 RADIOLOGY  EXAMINATION:   CT OF THE HEAD WITHOUT CONTRAST  2/17/2022 3:07 pm       TECHNIQUE:   CT of the head was performed without the administration of intravenous   contrast. Dose modulation, iterative reconstruction, and/or weight based   adjustment of the mA/kV was utilized to reduce the radiation dose to as low   as reasonably achievable.       COMPARISON:   June 2007       HISTORY:   ORDERING SYSTEM PROVIDED HISTORY: left sided sinus congestion   TECHNOLOGIST PROVIDED HISTORY:   If patient is on cardiac monitor and/or pulse ox, they may be taken off   cardiac monitor and pulse ox, left on O2 if currently on. All monitors   reattached when patient returns to room. Has a \"code stroke\" or \"stroke alert\" been called? ->No   Reason for exam:->left sided sinus congestion   Decision Support Exception - unselect if not a suspected or confirmed   emergency medical condition->Emergency Medical Condition (MA)   Reason for Exam: left sided sinus congestion       FINDINGS:   BRAIN/VENTRICLES: Ventricles are midline in position.  No intracerebral   masses are identified.  No mass effect.  No midline shift.  No acute   intracranial hemorrhage is seen. .  No focal white matter lesions       ORBITS: The visualized portion of the orbits demonstrate no acute abnormality.       SINUSES: No significant mastoid opacification noted.  No air-fluid level seen   in the sinuses.  There is mild mucosal thickening seen in the ethmoid air   cells.       SOFT TISSUES/SKULL:  No acute abnormality of the visualized skull or soft   tissues.           Impression   No acute intracranial abnormality.     Mild mucosal thickening in the ethmoids     Images CT head without contrast obtained on 2/17/2022 independently reviewed by myself which demonstrates mild mucosal thickening in the anterior bilateral ethmoid sinuses, no evidence of discrete nasal polyposis. Nasal septum with a leftward spur. Otherwise paranasal sinuses appear patent although the inferior aspect of the bilateral maxillary sinuses are incompletely viewed. EXAMINATION:   MODIFIED BARIUM SWALLOW WAS PERFORMED IN CONJUNCTION WITH SPEECH PATHOLOGY   SERVICES       TECHNIQUE:   Fluoroscopic evaluation of the swallowing mechanism was performed using   cineradiography with multiple consistency of barium product in conjunction   with speech pathology services.       FLUOROSCOPY DOSE AND TYPE OR TIME AND EXPOSURES:   1 minute 30 seconds, no spot images with multiple recorded video loops       COMPARISON:   None       HISTORY:   ORDERING SYSTEM PROVIDED HISTORY: difficulty swallowing   TECHNOLOGIST PROVIDED HISTORY:   Reason for exam:->difficulty swallowing       FINDINGS:   Oral phase of swallowing was grossly within normal limits.       No evidence of laryngeal penetration or aspiration.       Limited evaluation of the esophagus shows prompt passage of a barium tablet. No obvious stricture.           Impression   Swallowing mechanism grossly within normal limits without evidence of   aspiration.       Please see separate speech pathology report for full discussion of findings   and recommendations. ASSESSMENT/PLAN  Rishi Burton is a very pleasant 73 y.o. male with:    Dysphagia  Globus pharyngeus  Anxiety  - MBS WNL. Suspect panic attack/anxiety provoked vs reflux  - Will start emirpircally on PPI for 2 months. He will follow-up in my office in 2 months for reval on outpatient basis.  If symptoms persist can perform flex laryngoscopy in office.   - Recommend follow-up with PCP for treatment of anxiety    Nasal congestion  Deviated nasal septum  - CT head images reviewed. Mucosal thickening in left anterior ethmoid sinuses likely incidental. Nasal septum with left spur which could be contributing to left nasal congestion  - Start Flonase daily    Papilloma of nasal septum  - Small, appears benign. Not contributing to congestion  - Will re-evaluate at outpatient follow-up    Dizziness  - Improving. No room spinning, low concern for peripheral vestibulopathy.   - May have been provoked by associated headaches or anxiety/panic attack        Dr. Luis Fernando [place] [address]  Department of Otolaryngology/Head and Neck Surgery      Medical Decision Making: The following items were considered in medical decision making:  Independent review of images  Review / order clinical lab tests  Review / order radiology tests  Decision to obtain old records      This note was generated completely or in part utilizing Dragon dictation speech recognition software. Occasionally, words are mistranscribed and despite editing, the text may contain inaccuracies due to incorrect word recognition. If further clarification is needed please contact the office at [phone].

## 2022-02-18 NOTE — PROGRESS NOTES
Physical/Occupational Therapy  Jesse Hall  Orders received, chart reviewed. Attempted PT/OT evaluation this date. Pt currently off floor. Will re-attempt evaluation later this date as schedule allows.    [id number] [facility] PT, DPT [id number]   Meenu Leger, OTR/L [id number]

## 2022-02-18 NOTE — CARE COORDINATION
Discharge Planning:  I have reviewed the patient's medical history in detail and updated the computerized patient record. Patient independent prior to admission. There are no needs identified at this time. If something specific is identified, please notify Discharge Planner.     Electronically signed by Peace Cox RN on 2/18/2022 at 12:01 PM

## 2022-02-18 NOTE — PROGRESS NOTES
[address] THERAPY  MODIFIED BARIUM SWALLOW EVALUATION    Patient's Name: Montana Li. O.B: 1955  Medical Diagnosis: Dizziness [R42]  Blurry vision [H53.8]  Blurred vision, bilateral [H53.8]  Chest pain, unspecified type [R07.9]  Treatment Diagnosis: Dysphagia  Ordering MD: Dr. Ly Eaton  Radiologist: Dr. Janice Ormond  Date of Evaluation: 2/18/2022  Type of Study: Modified Barium Swallowing Study (MBS)  Diet Prior to Study:  Diet recommendations pending MBS results   Pain Level: Pt did not report pain    Impression:  Modified Barium Swallow evaluation completed on 2/18/2022. Results indicate mild oropharyngeal dysphagia characterized by incomplete epiglottic distension resulting in minimal vallecular residue, reduced anterior hyoid movement, and intermittent use of two swallows to clear thicker textures. Pt complains of globus sensation as well as the left side of his nose being \"plugged up. \" Swallow initiation was timely with no aspiration or laryngeal penetration view during study. With thicker textures (mildly thick, moderately thick liquid, and regular solid) minimal vallecular residue was present post swallow with Pt using two swallows to clear. Suspect reduced/incomplete epiglottic distension is contributing to globus sensation due to epiglottis making contact with post pharyngeal wall. A barium tablet was swallowed with no overt difficulty. Clearing through upper esophagus appeared functional. Pt may benefit from ENT consult due to reports of a plugged nose and globus sensation. Aspiration/Penetration Risk:  No risk identified     Recommendations:    Diet Level: Regular texture diet with thin liquids (recommend Pt choose softer food items), Meds as tolerated   Referral: Consider ENT and/or GI consult for \"plugged nose\" and globus sensation  Strategies:   Alternate solids/liquids , Upright as possible with all PO intake , Small bites/sips , Eat/feed slowly, Remain upright 30-45 min Treatments: Speech Therapy for dysphagia treatment 1-2x to ensure diet tolerance   Goals:  1.   Pt will functionally tolerate recommended diet level without s/s of aspiration/penetration     Consistencies given: Thin, Mildly Thick (Nectar) Liquids, Moderately Thick (honey) Liquids, Puree, Soft solid, Solid    Oral Phase  -Grossly within functional limits     Pharyngeal Phase  -Reduced/incomplete epiglottic distension  -Minimal vallecular residue with thickened liquids and regular solids, requiring two swallows to clear  -Reduced anterior hyoid movement  -No aspiration or laryngeal penetration was viewed with any texture during study     Esophageal Phase  Unremarkable    Following Evaluation:  Results/recommendations and education given to Patient and nurse, who verbalized understanding    Timed Code Treatment: 0 minutes    Total Treatment Time: 30 minutes     Bhargavi Kearney., [facility]  Speech-Language Pathologist

## 2022-02-18 NOTE — ED NOTES
Pt transported to  in stable condition and bedside report given to Cartela AB. NIHSS assessed and this RN scored Pt at 0, all questions answered, and Pt roomed in at 3380.

## 2022-02-18 NOTE — PROGRESS NOTES
Physical Therapy    Facility/Department: [address]  Initial Assessment/Discharge Summary    NAME: Keerthi Lowe  : 1955  MRN: [id number]    Date of Service: 2022    Discharge Recommendations:Prashant Wallace scored a 24/24 on the AM-PAC short mobility form. At this time, no further PT is recommended upon discharge due to patient at baseline function. Recommend patient returns to prior setting with prior services. PT Equipment Recommendations  Equipment Needed: No    Assessment   Assessment: Patient previously living at home with his wife and functioning independently. Patient currently presents with elevated blood presure following ambulation however able to perform mobility independently. Patient currently near baseline functional status and does not require PT services at this time. Prognosis: Good  Decision Making: Medium Complexity  Clinical Presentation: Evolving  PT Education: PT Role;General Safety (d/c recommendation)  Patient Education: Patient verbalized undrstanding. Barriers to Learning: None  REQUIRES PT FOLLOW UP: No  Activity Tolerance  Activity Tolerance: Treatment limited secondary to medical complications (free text)  Activity Tolerance: Patient fully particapted in therapy evaluation, however, reported sweating and a moderate headache following ambulation. BP was found to be 167/84 at this time--RN aware. Patient Diagnosis(es): The primary encounter diagnosis was Chest pain, unspecified type. Diagnoses of Blurred vision, left eye and Neck pain were also pertinent to this visit. has a past medical history of Hypertension. has a past surgical history that includes back surgery. Restrictions  Restrictions/Precautions  Restrictions/Precautions: Fall Risk (low fall risk)  Required Braces or Orthoses?: No  Position Activity Restriction  Other position/activity restrictions:  The patient is a 73y.o.  years old male with history of hypertension and smoking who came to the hospital yesterday with above concern. Symptoms started yesterday morning. Description feeling lightheaded and dizzy but no spinning sensation. Degree was moderate. Duration was persistent. No chest pain or headache but he did have some dysphagia and difficulties with sinus pressure. No other relieving or aggravating factors. No clear triggers. Came to the ED where he was admitted. Initial work-up with CT head showed no acute findings. Today he denies any new symptoms. No dizziness but feel sore his throat and difficulties with swallowing. Other review of system was unremarkable. Vision/Hearing  Vision: Impaired  Vision Exceptions: Wears glasses at all times  Hearing: Within functional limits       Subjective  General  Chart Reviewed: Yes  Patient assessed for rehabilitation services?: Yes  Response To Previous Treatment: Not applicable  Family / Caregiver Present: Yes (wife)  Diagnosis: Dizziness  Follows Commands: Within Functional Limits  Subjective  Subjective: Patient sitting EOB upon PT/OT arrival and agreeable to eval. PT reporting 3/10 pain in head--RN aware.   Pain Screening  Patient Currently in Pain: Yes  Pain Assessment  Pain Assessment: 0-10  Pain Level: 3  Pain Type: Acute pain  Pain Location: Head  Pain Orientation: Mid  Vital Signs  BP: (!) 167/84 (following ambulation)  BP Location: Left upper arm  Patient Position: Sitting (EOB)  Patient Currently in Pain: Yes  Oxygen Therapy  SpO2: 96 %  Pulse Oximeter Device Mode: Intermittent  Pulse Oximeter Device Location: Left;Finger  O2 Device: None (Room air)     Orientation  Orientation  Overall Orientation Status: Within Normal Limits     Social/Functional History  Social/Functional History  Lives With: Spouse  Type of Home: House  Home Layout: Two level  Home Access: Stairs to enter with rails  Entrance Stairs - Number of Steps: 14  Entrance Stairs - Rails: Right  Bathroom Shower/Tub: Walk-in shower  Bathroom Equipment: Grab bars around toilet  Bathroom Accessibility: Accessible  Home Equipment: Sock aid  ADL Assistance: [address]: Independent  Homemaking Responsibilities: Yes  Ambulation Assistance: Independent  Transfer Assistance: Independent  Active : Yes  Mode of Transportation: Car  Occupation: Retired  Type of occupation:   Additional Comments: No falls in the 6 months     Cognition   Cognition  Overall Cognitive Status: WNL    Objective  Observation/Palpation  Posture: Good  AROM RLE (degrees)  RLE AROM: WFL  AROM LLE (degrees)  LLE AROM : WFL  Strength RLE  Strength RLE: WFL  Comment: Grossly 5/5  Strength LLE  Strength LLE: WFL  Comment: Grossly 5/5  Bed mobility  Supine to Sit: Independent  Sit to Supine: Independent  Scooting: Independent  Transfers  Sit to Stand: Independent (x2 EOB)  Stand to sit: Independent (x1 EOB; x1 transport chair)  Ambulation  Ambulation?: Yes  Ambulation 1  Surface: level tile  Device: No Device  Assistance: Independent  Quality of Gait: slight medial-lateral sway noted  Distance: 50'+ 50'  Comments: Patient ambulated safely demonstrating no LOB. Stairs/Curb  Stairs?: Yes  Stairs  # Steps : 12  Stairs Height: 6\"  Rails: None  Device: No Device  Assistance: Independent  Comment: Negotiated with reciprocal gait pattern, no LOB. Pt reporting no symptoms with mobility.   Balance  Posture: Good  Sitting - Static: Good (Independent seated EOB during vision assessment)  Sitting - Dynamic: Good (Crossing midline- independent)  Standing - Static: Good (Independent)  Standing - Dynamic: Good (Independent with ambulation)    Plan   Plan  Times per week: discharge  Safety Devices  Type of devices: Nurse notified,Gait belt (pt left seated in transport chair with transporter taking pt to MRI; RN aware)  Restraints  Initially in place: No    AM-PAC Score  AM-PAC Inpatient Mobility Raw Score : 24 (02/18/22 1441)  AM-PAC Inpatient T-Scale Score : 61.14 (02/18/22 1441)  Mobility Inpatient CMS 0-100% Score: 0 (02/18/22 1441)  Mobility Inpatient CMS G-Code Modifier : CH (02/18/22 1441)     Goals  Short term goals  Time Frame for Short term goals: none, discharge from acute PT caseload     Therapy Time   Individual Concurrent Group Co-treatment   Time In 1345         Time Out 1415         Minutes 30         Timed Code Treatment Minutes: 15 Minutes  Timed Code Treatment Minutes:  15 Minutes    Total Treatment Minutes:  [address], [facility]    PT providing direct supervision during session and assisting in making skilled judgements throughout session.   [id number] [facility] PT, DPT [id number]    [id number] [facility], PT

## 2022-02-18 NOTE — PROGRESS NOTES
Occupational Therapy   Occupational Therapy Initial Assessment and Discharge  Date: 2022   Patient Name: Desiree Cotter  MRN: [id number]     : 1955    Date of Service: 2022    Discharge Recommendations:Prashant Edge scored a 24/24 on the -[facility] ADL Inpatient form. At this time, no further OT is recommended upon discharge due to patient at baseline function. Recommend patient returns to prior setting with prior services. OT Equipment Recommendations  Equipment Needed: No    Assessment   Performance deficits / Impairments: Decreased vision/visual deficit  Assessment: Pt's occupational function is at baseline and will not be picked up for OT caseload. Prognosis: Good  Decision Making: Low Complexity  OT Education: OT Role;Plan of Care  Patient Education: Pt is an independent learner  REQUIRES OT FOLLOW UP: No  Activity Tolerance  Activity Tolerance: Patient Tolerated treatment well;Treatment limited secondary to medical complications (free text)  Activity Tolerance: Pt became sweaty and fatigued at end of eval. Pt stated that was not normal. Nurse notified. /84. Safety Devices  Safety Devices in place:  (Pt taken to MRI, nurse notified)           Patient Diagnosis(es): The primary encounter diagnosis was Chest pain, unspecified type. Diagnoses of Blurred vision, left eye and Neck pain were also pertinent to this visit. has a past medical history of Hypertension. has a past surgical history that includes back surgery. Restrictions  Restrictions/Precautions  Restrictions/Precautions: Fall Risk (low fall risk)  Required Braces or Orthoses?: No  Position Activity Restriction  Other position/activity restrictions: The patient is a 73y.o.  years old male with history of hypertension and smoking who came to the hospital yesterday with above concern. Symptoms started yesterday morning. Description feeling lightheaded and dizzy but no spinning sensation.   Degree was moderate. Duration was persistent. No chest pain or headache but he did have some dysphagia and difficulties with sinus pressure. No other relieving or aggravating factors. No clear triggers. Came to the ED where he was admitted. Initial work-up with CT head showed no acute findings. Today he denies any new symptoms. No dizziness but feel sore his throat and difficulties with swallowing. Other review of system was unremarkable.     Subjective   General  Chart Reviewed: Yes  Patient assessed for rehabilitation services?: Yes  Additional Pertinent Hx: HTN  Family / Caregiver Present: Yes (wife)  Diagnosis: Dizziness  Subjective  Subjective: Pt supine in bed upon arrival. Agreeable to OT eval and stated 3/10 pain in his head  Patient Currently in Pain: Yes  Pain Assessment  Pain Assessment: 0-10  Pain Level: 3  Patient's Stated Pain Goal: 4  Pain Type: Acute pain  Pain Location: Head  Pain Orientation: Mid  Vital Signs  BP: (!) 167/84 (Taken after ambulating and going up/down 14 steps) RN in room aware of BP   BP Location: Left upper arm  Patient Position: Sitting (EOB)  Patient Currently in Pain: Yes  Oxygen Therapy  O2 Device: None (Room air)  Social/Functional History  Social/Functional History  Lives With: Spouse  Type of Home: House  Home Layout: Two level  Home Access: Stairs to enter with rails  Entrance Stairs - Number of Steps: 14  Entrance Stairs - Rails: Right  Bathroom Shower/Tub: Walk-in shower  Bathroom Equipment: Grab bars around toilet  Bathroom Accessibility: Accessible  Home Equipment: Sock aid  ADL Assistance: Independent  Homemaking Assistance: Independent  Homemaking Responsibilities: Yes  Ambulation Assistance: Independent  Transfer Assistance: Independent  Active : Yes  Mode of Transportation: Car  Occupation: Retired  Type of occupation:   Additional Comments: No falls in the 6 months       Objective   Vision: Impaired  Vision Exceptions: Wears glasses at all times  Hearing: Within functional limits    Orientation  Overall Orientation Status: Within Normal Limits  Observation/Palpation  Posture: Good  Balance  Sitting Balance: Independent  Standing Balance: Independent  Standing Balance  Time: 5 minutes  Activity: ambulating in hallway, 14 steps up/down  Functional Mobility  Functional - Mobility Device: No device  Activity: Other (ambulated in hallway and 14 steps up/down)  Assist Level: Supervision  ADL  Additional Comments: Pt denied ADLs  Tone RUE  RUE Tone: Normotonic  Tone LUE  LUE Tone: Normotonic  Coordination  Movements Are Fluid And Coordinated: Yes     Bed mobility  Supine to Sit: Independent  Scooting: Independent  Transfers  Sit to stand: Independent  Stand to sit: Independent  Vision - Basic Assessment  Prior Vision: Wears glasses all the time  Visual History: Other (add comment) (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 Vision resolved after sitting EOB x ~5 min)  Visual Field Cut: No  Oculo Motor Control: WNL  Cognition  Overall Cognitive Status: WNL  Perception  Overall Perceptual Status: WFL     Sensation  Overall Sensation Status: WNL        LUE AROM (degrees)  LUE AROM : WFL  Left Hand AROM (degrees)  Left Hand AROM: WFL  RUE AROM (degrees)  RUE AROM : WFL  Right Hand AROM (degrees)  Right Hand AROM: WFL  LUE Strength  Gross LUE Strength:  WNL  RUE Strength  Gross RUE Strength: WNL        Plan   Plan  Times per week: d/c from OT caseload      AM-PAC Score        AM-[facility] Inpatient Daily Activity Raw Score: 24 (02/18/22 1437)  AM-PAC Inpatient ADL T-Scale Score : 57.54 (02/18/22 1437)  ADL Inpatient CMS 0-100% Score: 0 (02/18/22 1437)  ADL Inpatient CMS G-Code Modifier : 509 [address] (02/18/22 1437)    Goals  Patient Goals   Patient goals : eval and d/c from OT caseload       Therapy Time   Individual Concurrent Group Co-treatment   Time In 1415         Time Out 1445         Minutes 30           Timed Code Treatment Minutes:  15 Minutes    Total Treatment Minutes:  30 minutes   Omayra Stewart, S/OT    Leticia Stanford OT   Therapist directly observed and directed this treatment.   Ant Sheriff, OTR/L [id number]

## 2022-02-18 NOTE — PROGRESS NOTES
Facility/Department: [address]  Initial Assessment  DYSPHAGIA BEDSIDE SWALLOW EVALUATION     Patient: Perri Chase   : 1955   MRN: [id number]      Evaluation Date: 2022   Admitting Diagnosis: Dizziness [R42]  Blurry vision [H53.8]  Blurred vision, bilateral [H53.8]  Chest pain, unspecified type [R07.9]  Pain: Pt did not report pain                       H&P: Perri Chase is a 73 y.o. male with h/o HTN , nicotine use presented with c/o dizziness, blurred vision , dyspnea , and  tremors . He reported feeling anxious and felt he is unable to breath. No focal muscle weakness, numbness or tingling. Stroke alert was called in the ED. CTA head and CT head showed no hemorrhage or stroke. Cardiac work up showed troponin < 0.01. EKG showed NSR  . Chest xray showed borderline cardiomegaly     Chest xray:   1. Hazy opacity projectingi at the right lung base could represent   atelectasis versus pneumonia in the proper clinical setting.  Follow-up to   resolution is recommended. 2. Borderline cardiomegaly     Head CT:   No acute intracranial abnormality.       Mild mucosal thickening in the ethmoids     Modified Barium Swallow Study: None per chart    History/Prior Level of Function:   Living Status: Pt in from home  Prior Dysphagia History: Pt reported occasional difficulty with swallowing medication prior to admission. Pt with more sudden onset of dysphagia and feeling like food and liquid \"won't go down. \"     Dysphagia Impressions/Diagnosis: Mild-Moderate Oropharyngeal Dysphagia   Pt was seen sitting upright on edge of bed, he reported onset of difficulty with swallowing. Pt reported feeling like his nasal cavity is plugged up on left side and sensation of foods and liquid sticking in left side of throat.  Pt reported difficulty with swallowing robertson this am therefore he stopped eating his meal. Pt reported no history of dysphagia other than occasional difficulty with swallowing his medication. Oral motor exam largely within functional limits. Trials of thin liquids and purees were provided. Use of multiple swallows noted across textures with eventual slight change in vocal quality following purees. Pt was noted to clear his throat and use multiple swallows in attempts to clear puree texture, question possible backflow vs reduced pharyngeal clearing. No further PO trials were provided. Recommend Modified Barium Swallow Study to further assess the pharyngeal phase of swallowing and to assess for aspiration     Recommended Diet and Intervention 2/18/2022:  Diet Solids Recommendation:  Diet recommendations pending [facility] Liquid Consistency Recommendation:  Diet recommendations pending [facility] Recommended form of Meds:  Diet recommendations pending [facility]     Compensatory Swallowing Strategies: Alternate solids/liquids , Upright as possible with all PO intake , Small bites/sips , Eat/feed slowly, Remain upright 30-45 min     SHORT TERM DYSPHAGIA GOALS/PLAN OF CARE: Speech therapy for dysphagia tx 3-5 times per week during acute care stay.     Pt will advance to least restrictive diet as indicated   Pt will participate in Modified Barium swallow study to further assess pharyngeal phase of swallow, assist with diet recommendations and to further direct plan of care     Dysphagia Therapeutic Intervention:  Diet Tolerance Monitoring , Patient/Family Education , Instrumental assessment of swallow function (Modified Barium Swallow Study)     Discharge Recommendations: Discharge recommendations to be determined pending ongoing follow-up during acute care stay    Patient Positioning: Upright in bed    Current Diet Level (prior to evaluation): Regular texture diet  Thin liquids      Respiratory Status:   [x]Room Air   []O2 via nasal cannula   []Other:    Dentition:  [x]Adequate  []Dentures   []Missing Many Teeth  []Edentulous  []Other:    Baseline Vocal Quality:  [x]Normal  []Dysphonic   []Aphonic []Hoarse  []Wet  []Weak  []Other:    Volitional Cough:  [x]Strong  []Weak  []Wet  []Absent  []Congested  []Other:    Volitional Swallow:   []Absent   []Delayed     [x]Adequate     []Required use of drink     Oral Mechanism Exam:  [x]WFL []Mild   [] Moderate  []Severe  []To be assessed  Impaired:   []Left side      []Right side    []Labial ROM/Coordination    []Labial Symmetry   []Lingual ROM/Coordination   []Lingual Symmetry  []Gag  []Other:     Oral Phase: [x]WFL []Mild   [] Moderate  []Severe  []To be assessed   []Impaired/Prolonged Mastication:   []Spillage Left:   []Spillage Right:  []Pocketing Left:   []Pocketing Right:   []Decreased Anterior to Posterior Transit:   []Suspected Premature Bolus Loss:   []Lingual/Palatal Residue:   []Other:     Pharyngeal Phase: []WFL [x]Mild   [x] Moderate  []Severe  []To be assessed   []Delayed Swallow:   [x]Suspected Pharyngeal Pooling:   [x]Decreased Laryngeal Elevation:   []Absent Swallow:  []Wet Vocal Quality:   []Throat Clearing-Immediate:   []Throat Clearing-Delayed:   []Cough-Immediate:   []Cough-Delayed:  []Change in Vital Signs:  [x]Suspected Delayed Pharyngeal Clearing:  []Other:       Eating Assistance:  [x]Independent  []Setup or clean-up assistance   [] Supervision or touching assistance   [] Partial or moderate assistance   [] Substantial or maximal assistance  [] Dependent       EDUCATION:   Provided education regarding role of SLP, results of assessment, recommendations and general speech pathology plan of care. [x] Pt verbalized understanding and agreement   [] Pt requires ongoing learning   [] No evidence of comprehension     If patient discharges prior to next visit, this note will serve as discharge.      Timed Code Minutes: 0 minutes   Total Treatment Minutes: 23 minutes     Electronically signed by:   Julio Valentine M.A., [address] [place] [address]  Speech-Language Pathologist

## 2022-02-18 NOTE — PROGRESS NOTES
Q8H PRN   Or  ondansetron (ZOFRAN) injection 4 mg, Q6H PRN  polyethylene glycol (GLYCOLAX) packet 17 g, Daily PRN  enoxaparin (LOVENOX) injection 40 mg, Daily  aspirin EC tablet 81 mg, Daily   Or  aspirin suppository 300 mg, Daily  rosuvastatin (CRESTOR) tablet 40 mg, Nightly  oxyCODONE-acetaminophen (PERCOCET) 5-325 MG per tablet 1 tablet, Q4H PRN        Objective:  /73   Pulse 62   Temp 96.2 °F (35.7 °C) (Temporal)   Resp 16   Ht 5' 9\" (1.753 m)   Wt 283 lb (128.4 kg)   SpO2 93%   BMI 41.79 kg/m²     Intake/Output Summary (Last 24 hours) at 2/18/2022 3021  Last data filed at 2/17/2022 1843  Gross per 24 hour   Intake 480 ml   Output    Net 480 ml      Wt Readings from Last 3 Encounters:   02/17/22 283 lb (128.4 kg)   11/14/17 289 lb (131.1 kg)       Physical Examination:   General appearance:  No apparent distress, appears stated age and cooperative. Eyes: Sclera clear. Pupils equal.  ENT: Moist oral mucosa. Trachea midline, no adenopathy. Cardiovascular: Regular rhythm, normal S1, S2. No murmur. No edema in lower extremities  Respiratory:Not using accessory muscles. Good inspiratory effort. Clear to auscultation bilaterally, no wheeze or crackles. GI: Abdomen soft, no tenderness, not distended, normal bowel sounds  Musculoskeletal: normal ROM, No cyanosis in digits  Neurology: CN 2-12 grossly intact. No speech or motor deficits  Psych: Normal affect.  Alert and oriented in time, place and person  Skin: Warm, dry, normal turgor    Labs and Tests:  CBC:   Recent Labs     02/17/22  1506 02/18/22  0549   WBC 8.7 7.1   HGB 14.8 14.5    218     BMP:    Recent Labs     02/17/22  1506 02/18/22  0549    139   K 4.3 4.9    104   CO2 25 26   BUN 17 14   CREATININE 1.3 1.1   GLUCOSE 104* 127*     Hepatic:   Recent Labs     02/17/22  1506   AST 20   ALT 31   BILITOT 0.3   ALKPHOS 89     CTA HEAD NECK W CONTRAST   Final Result   Addendum 1 of 1   ADDENDUM:   Findings of the study were conveyed to physician assistant Ms. The Leary Tejeda at 5:10 p.m. on 07/17/2022. Final   Unremarkable CTA of the head and neck. RECOMMENDATIONS:   Unavailable            XR CHEST PORTABLE   Final Result   1. Hazy opacity projectingi at the right lung base could represent   atelectasis versus pneumonia in the proper clinical setting. Follow-up to   resolution is recommended. 2. Borderline cardiomegaly. CT HEAD WO CONTRAST   Final Result   No acute intracranial abnormality. Mild mucosal thickening in the ethmoids      RECOMMENDATIONS:   Unavailable         MRI brain without contrast    (Results Pending)   Fluoroscopy modified barium swallow with video    (Results Pending)       Problem List  Principal Problem:    Dizziness  Active Problems:    Blurry vision  Resolved Problems:    * No resolved hospital problems.  Monse Parham MD   2/18/2022 9:58 AM

## 2022-02-19 NOTE — PLAN OF CARE
Problem: Pain:  Goal: Pain level will decrease  Description: Pain level will decrease  2/18/2022 1938 by Lalo Forman RN  Outcome: Completed  2/18/2022 1938 by Lalo Forman RN  Outcome: Ongoing  Goal: Control of acute pain  Description: Control of acute pain  2/18/2022 1938 by Lalo Forman RN  Outcome: Completed  2/18/2022 1938 by Lalo Forman RN  Outcome: Ongoing  Goal: Control of chronic pain  Description: Control of chronic pain  2/18/2022 1938 by Lalo Forman RN  Outcome: Completed  2/18/2022 1938 by Lalo Forman RN  Outcome: Ongoing

## 2022-02-19 NOTE — PLAN OF CARE
Problem: Pain:  Goal: Pain level will decrease  Description: Pain level will decrease  2/18/2022 1938 by Mikki Stokes RN  Outcome: Completed  2/18/2022 1938 by Mikki Stokes RN  Outcome: Ongoing  Goal: Control of acute pain  Description: Control of acute pain  2/18/2022 1938 by Mikki Stokes RN  Outcome: Completed  2/18/2022 1938 by Mikki Stokes RN  Outcome: Ongoing  Goal: Control of chronic pain  Description: Control of chronic pain  2/18/2022 1938 by Mikki Stokes RN  Outcome: Completed  2/18/2022 1938 by Mikki Stokes RN  Outcome: Ongoing

## 2022-02-19 NOTE — PROGRESS NOTES
Okayed per hospitalist for pt to dc. Mri and echo negative. Pt will f/up with ent outpt. PIV and tele removed without any complications. Pt dcd home with paperwork and belongings.  Electronically signed by Sara Francis RN on 2/18/2022 at 8:44 PM

## 2022-02-21 NOTE — DISCHARGE SUMMARY
Hospital Medicine Discharge Summary    Patient ID: Alondra Hercules      Patient's PCP: Ryder Flores MD    Admit Date: 2/17/2022     Discharge Date: 2/18/2022     Admitting Physician: Edith Cooper MD     Discharge Physician: Stephanie Sen MD        Active Hospital Problems    Diagnosis     Oral phase dysphagia [R13.11]     HTN (hypertension), benign [I10]     Dizziness [R42]          Hospital Course: This 77 y. o. male with PMHx of hypertension, DDD, osteoarthritis, chronic pain syndrome and smoking abuse presented with with dizziness, blurry vision, dysphagia and dyspnea  Stroke alert was called in the ED. CTA head and CT head showed no hemorrhage or stroke. Cardiac work up showed troponin < 0.01. EKG showed NSR, chest xray showed borderline cardiomegaly. Acute onset dizziness, blurry vision and difficulty swallowing: Of unclear etiology as work-up so far negative; likely anxiety/panic. Examination nonfocal.  Neurology was consulted and patient underwent MRI brain which is negative for any acute pathology. No sign of arrhythmia on telemetry during hospital stay. Echo unremarkable.     Dysphagia: Likely globus pharyngeus. MBS within normal limits. ENT was consulted and per their recs, patient was started on PPI for 2 months. Patient was instructed to follow-up with ENT in 2 months.     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 Outpatient ENT follow-up recommended    Hx of smoking abuse: Smoking cessation counseling provided.     History of DDD and osteoarthritis: Status post spinal and knee replacement surgeries     Chronic pain syndrome: On Percocet         Physical Exam Performed:     /83   Pulse 60   Temp 96.7 °F (35.9 °C) (Temporal)   Resp 18   Ht 5' 9\" (1.753 m)   Wt 283 lb (128.4 kg)   SpO2 92%   BMI 41.79 kg/m²     General appearance:  No apparent distress, appears stated age and cooperative. Eyes: Sclera clear. Pupils equal.  ENT: Moist oral mucosa.  Trachea midline, no adenopathy. Cardiovascular: Regular rhythm, normal S1, S2. No murmur. No edema in lower extremities  Respiratory:Not usingaccessory muscles. Good inspiratory effort. Clear to auscultation bilaterally, no wheeze or crackles. GI: Abdomen soft, no tenderness, not distended, normal bowel sounds  Musculoskeletal: Normal ROM, No cyanosis in digits. Neurology: CN 2-12 grossly intact. No speech or motor deficits  Psych: Normal affect. Alert and oriented in time, place and person  Skin: Warm, dry, normal turgor      Labs: For convenience and continuity at follow-up the following most recent labs are provided:      CBC:    Lab Results   Component Value Date    WBC 7.1 02/18/2022    HGB 14.5 02/18/2022    HCT 44.0 02/18/2022     02/18/2022       Renal:    Lab Results   Component Value Date     02/18/2022    K 4.9 02/18/2022    K 4.3 02/17/2022     02/18/2022    CO2 26 02/18/2022    BUN 14 02/18/2022    CREATININE 1.1 02/18/2022    CALCIUM 9.2 02/18/2022         Significant Diagnostic Studies    Radiology:   MRI brain without contrast   Final Result   No acute intracranial abnormality visualized. Fluoroscopy modified barium swallow with video   Final Result   Swallowing mechanism grossly within normal limits without evidence of   aspiration. Please see separate speech pathology report for full discussion of findings   and recommendations. CTA HEAD NECK W CONTRAST   Final Result   Addendum 1 of 1   ADDENDUM:   Findings of the study were conveyed to physician assistant Ms. The Cherrie Escobedo at 5:10 p.m. on 07/17/2022. Final   Unremarkable CTA of the head and neck. RECOMMENDATIONS:   Unavailable            XR CHEST PORTABLE   Final Result   1. Hazy opacity projectingi at the right lung base could represent   atelectasis versus pneumonia in the proper clinical setting. Follow-up to   resolution is recommended. 2. Borderline cardiomegaly.          CT HEAD WO CONTRAST   Final Result   No acute intracranial abnormality. Mild mucosal thickening in the ethmoids      RECOMMENDATIONS:   Unavailable                Consults:     IP CONSULT TO HOSPITALIST  IP CONSULT TO NEUROLOGY  IP CONSULT TO OTOLARYNGOLOGY    Disposition: Home    Condition at Discharge: Stable     Discharge Instructions/Follow-up:   Follow up with your PCP within 3-4 days of discharge for anxiety evaluation & management   Follow up with ENT in 2 months   Take all your medications as prescribed. Discharge Medications:     Discharge Medication List as of 2/18/2022  8:04 PM           Details   aspirin 81 MG EC tablet Take 1 tablet by mouth daily, Disp-30 tablet, R-3Print      pantoprazole (PROTONIX) 40 MG tablet Take 1 tablet by mouth every morning (before breakfast), Disp-30 tablet, R-3Normal              Details   Losartan Potassium (COZAAR PO) Take by mouthHistorical Med      oxyCODONE-acetaminophen (PERCOCET) 5-325 MG per tablet Take 1 tablet by mouth every 4 hours as needed for Pain. Historical Med      VITAMIN D, CHOLECALCIFEROL, PO Take by mouthHistorical Med             The patient was seen and examined on day of discharge and this discharge summary is in conjunction with any daily progress note from day of discharge. Time Spent on discharge is 45 minutes  in the examination, evaluation, counseling and review of medications and discharge plan. Note that greater  than 30 minutes was spent in preparing discharge papers, discussing discharge with patient, medication review, etc.     Signed:    Joce Skinner MD   2/21/2022      Thank you Agustín Howard MD for the opportunity to be involved in this patient's care. If you have any questions or concerns please feel free to contact me at [phone].

## 2023-07-06 ENCOUNTER — HOSPITAL ENCOUNTER (OUTPATIENT)
Dept: MRI IMAGING | Age: 68
Discharge: HOME | End: 2023-07-06
Payer: COMMERCIAL

## 2023-07-06 DIAGNOSIS — M47.817 LUMBOSACRAL SPONDYLOSIS WITHOUT MYELOPATHY: ICD-10-CM

## 2023-07-06 PROCEDURE — 72148 MRI LUMBAR SPINE W/O DYE: CPT

## 2023-08-09 ENCOUNTER — TRANSCRIBE ORDERS (OUTPATIENT)
Dept: ADMINISTRATIVE | Age: 68
End: 2023-08-09

## 2023-08-09 DIAGNOSIS — M43.10 SPONDYLOLISTHESIS, UNSPECIFIED SPINAL REGION: Primary | ICD-10-CM

## 2023-08-28 ENCOUNTER — HOSPITAL ENCOUNTER (OUTPATIENT)
Dept: CT IMAGING | Age: 68
Discharge: HOME | End: 2023-08-28
Attending: NEUROLOGICAL SURGERY
Payer: COMMERCIAL

## 2023-08-28 DIAGNOSIS — M43.10 SPONDYLOLISTHESIS, UNSPECIFIED SPINAL REGION: ICD-10-CM

## 2023-08-28 PROCEDURE — 72131 CT LUMBAR SPINE W/O DYE: CPT

## 2025-05-28 NOTE — H&P
HOSPITALISTS HISTORY AND PHYSICAL    02/17/22  Patient Information:  Denisse Escalante is a 73 y.o. male [id number]  PCP:  Linda Morrell MD (Tel: [phone] )    Chief complaint:    Chief Complaint   Patient presents with    Shortness of Breath     Pt arrives via [facility], c/o sob and dizziness starting about 1310 today. Pt states it feels like his \"throat is closing up\" and states he has blurriness in his vision beginning on his left side now experiencing it on his right side. Pt also c/o muscle shaking and anxiety.  Dizziness        History of Present Illness:  Justin Harman is a 73 y.o. male with h/o HTN , nicotine use presented with c/o dizziness, blurred vision , dyspnea , and  tremors . He reported feeling anxious and felt he is unable to breath. No focal muscle weakness, numbness or tingling. Stroke alert was called in the ED. CTA head and CT head showed no hemorrhage or stroke. Cardiac work up showed troponin < 0.01. EKG showed NSR  . Chest xray showed borderline cardiomegaly       History obtained from patient . Old medical records reviewed        REVIEW OF SYSTEMS:   Constitutional: Negative for fever,chills or night sweats  ENT: Negative for rhinorrhea, epistaxis, hoarseness, sore throat. Respiratory: Negative for shortness of breath,wheezing  Cardiovascular: Negative for chest pain, palpitations   Gastrointestinal: Negative for nausea, vomiting, diarrhea  Genitourinary: Negative for polyuria, dysuria   Hematologic/Lymphatic: Negative for bleeding tendency, easy bruising  Musculoskeletal: Negative for myalgias and arthralgias  Neurologic: Negative for confusion,dysarthria. Skin: Negative for itching,rash  Psychiatric: Negative for depression,anxiety, agitation. Endocrine: Negative for polydipsia,polyuria,heat /cold intolerance.     Past Medical History:   has a past Patient returning phone call to schedule EGD/EMR. Patient can be reached at [phone]   medical history of Hypertension. Past Surgical History:   has a past surgical history that includes back surgery. Medications:  No current facility-administered medications on file prior to encounter. Current Outpatient Medications on File Prior to Encounter   Medication Sig Dispense Refill    Losartan Potassium (COZAAR PO) Take by mouth      oxyCODONE-acetaminophen (PERCOCET) 5-325 MG per tablet Take 1 tablet by mouth every 4 hours as needed for Pain.  VITAMIN D, CHOLECALCIFEROL, PO Take by mouth       Current Facility-Administered Medications   Medication Dose Route Frequency Provider Last Rate Last Admin    ondansetron (ZOFRAN-ODT) disintegrating tablet 4 mg  4 mg Oral Q8H PRN Vera Irving MD        Or    ondansetron (ZOFRAN) injection 4 mg  4 mg IntraVENous Q6H PRN Vera Irving MD        polyethylene glycol (GLYCOLAX) packet 17 g  17 g Oral Daily PRN Vera Irving MD        enoxaparin (LOVENOX) injection 40 mg  40 mg SubCUTAneous Daily Vera Irving MD        aspirin EC tablet 81 mg  81 mg Oral Daily Vera Irving MD        Or   Danyel Riddles aspirin suppository 300 mg  300 mg Rectal Daily Vera Irving MD        rosuvastatin (CRESTOR) tablet 40 mg  40 mg Oral Nightly eVra Irving MD        oxyCODONE-acetaminophen (PERCOCET) 5-325 MG per tablet 1 tablet  1 tablet Oral Q4H PRN Vera Irving MD         Allergies: Allergies   Allergen Reactions    Unable To Assess      \" some Rx for chemical stress test caused SOB\"        Social History:  Patient Lives   reports that he has never smoked. He has never used smokeless tobacco. He reports previous alcohol use. He reports current drug use. Drug: Marijuana Marlene Kent). Family History:  family history is not on file.  , family history reviewed not pertinent to current admission    Physical Exam:  /68   Pulse 55   Temp 97.6 °F (36.4 °C) (Temporal)   Resp 16   Ht 5' 9\" (1.753 m)   Wt 283 lb (128.4 kg)   SpO2 94%   BMI 41.79 kg/m²     General appearance:  Appears comfortable. Well nourished  Eyes: Sclera clear, pupils equal  ENT: Moist mucus membranes, no thrush. Trachea midline. Cardiovascular: Regular rhythm, normal S1, S2. No murmur, gallop, rub. No edema in lower extremities  Respiratory: Clear to auscultation bilaterally, no wheeze, good inspiratory effort  Gastrointestinal: Abdomen soft, non-tender, not distended, normal bowel sounds  Musculoskeletal: No cyanosis in digits, neck supple  Neurology: Cranial nerves grossly intact. Alert and oriented in time, place and person. No speech or motor deficits  Psychiatry: Appropriate affect. Not agitated  Skin: Warm, dry, normal turgor, no rash  Brisk capillary refill, peripheral pulses palpable   Labs:  CBC:   Lab Results   Component Value Date    WBC 8.7 02/17/2022    RBC 4.84 02/17/2022    HGB 14.8 02/17/2022    HCT 44.6 02/17/2022    MCV 92.3 02/17/2022    MCH 30.5 02/17/2022    MCHC 33.0 02/17/2022    RDW 13.6 02/17/2022     02/17/2022    MPV 7.1 02/17/2022     BMP:    Lab Results   Component Value Date     02/17/2022    K 4.3 02/17/2022     02/17/2022    CO2 25 02/17/2022    BUN 17 02/17/2022    CREATININE 1.3 02/17/2022    CALCIUM 9.7 02/17/2022    GFRAA >60 02/17/2022    LABGLOM 55 02/17/2022    GLUCOSE 104 02/17/2022     CTA HEAD NECK W CONTRAST   Final Result   Addendum 1 of 1   ADDENDUM:   Findings of the study were conveyed to physician assistant son Delvalle at 5:10 p.m. on 07/17/2022. Final   Unremarkable CTA of the head and neck. RECOMMENDATIONS:   Unavailable            XR CHEST PORTABLE   Final Result   1. Hazy opacity projectingi at the right lung base could represent   atelectasis versus pneumonia in the proper clinical setting. Follow-up to   resolution is recommended. 2. Borderline cardiomegaly. CT HEAD WO CONTRAST   Final Result   No acute intracranial abnormality.       Mild mucosal thickening in the ethmoids      RECOMMENDATIONS: Unavailable         MRI brain without contrast    (Results Pending)     Chest Xray:   EKG:    I visualized CXR images and EKG strips    Discussed case  with     Problem List  Principal Problem:    Dizziness  Active Problems:    Blurry vision  Resolved Problems:    * No resolved hospital problems. *        Assessment/Plan:   Dizziness and blurred vision   Admit to r/out CVA  MRI brain and ECHO cardiogram ordered  Cont ASA and statin   Neuro checks, PT/OT and SLP eval  neurology consulted       DVT prophylaxis Lovenox  Code status full   Diet   IV access   Siu Catheter    Admit as inpatient. I anticipate hospitalization spanning more than two midnights for investigation and treatment of the above medically necessary diagnoses. Please note that some part of this chart was generated using Dragon dictation software. Although every effort was made to ensure the accuracy of this automated transcription, some errors in transcription may have occurred inadvertently. If you may need any clarification, please do not hesitate to contact me through [facility].        Vera Irving MD    02/17/22